# Patient Record
Sex: FEMALE | Race: ASIAN
[De-identification: names, ages, dates, MRNs, and addresses within clinical notes are randomized per-mention and may not be internally consistent; named-entity substitution may affect disease eponyms.]

---

## 2019-10-03 ENCOUNTER — RESULT REVIEW (OUTPATIENT)
Age: 69
End: 2019-10-03

## 2022-04-22 ENCOUNTER — APPOINTMENT (OUTPATIENT)
Dept: ORTHOPEDIC SURGERY | Facility: CLINIC | Age: 72
End: 2022-04-22

## 2022-05-04 ENCOUNTER — APPOINTMENT (OUTPATIENT)
Dept: ORTHOPEDIC SURGERY | Facility: CLINIC | Age: 72
End: 2022-05-04
Payer: MEDICARE

## 2022-05-04 DIAGNOSIS — Z86.79 PERSONAL HISTORY OF OTHER DISEASES OF THE CIRCULATORY SYSTEM: ICD-10-CM

## 2022-05-04 DIAGNOSIS — Z85.3 PERSONAL HISTORY OF MALIGNANT NEOPLASM OF BREAST: ICD-10-CM

## 2022-05-04 DIAGNOSIS — Z86.39 PERSONAL HISTORY OF OTHER ENDOCRINE, NUTRITIONAL AND METABOLIC DISEASE: ICD-10-CM

## 2022-05-04 PROBLEM — Z00.00 ENCOUNTER FOR PREVENTIVE HEALTH EXAMINATION: Status: ACTIVE | Noted: 2022-05-04

## 2022-05-04 PROCEDURE — 99213 OFFICE O/P EST LOW 20 MIN: CPT

## 2022-05-04 PROCEDURE — 73120 X-RAY EXAM OF HAND: CPT | Mod: RT

## 2022-05-04 RX ORDER — DICLOFENAC SODIUM 1% 10 MG/G
1 GEL TOPICAL 4 TIMES DAILY
Qty: 1 | Refills: 0 | Status: ACTIVE | COMMUNITY
Start: 2022-05-04 | End: 1900-01-01

## 2022-05-04 RX ORDER — LETROZOLE TABLETS 2.5 MG/1
2.5 TABLET, FILM COATED ORAL
Refills: 0 | Status: ACTIVE | COMMUNITY

## 2022-05-04 RX ORDER — SITAGLIPTIN 100 MG/1
100 TABLET, FILM COATED ORAL
Refills: 0 | Status: ACTIVE | COMMUNITY

## 2022-05-04 RX ORDER — LOSARTAN POTASSIUM 50 MG/1
50 TABLET, FILM COATED ORAL
Refills: 0 | Status: ACTIVE | COMMUNITY

## 2022-05-04 RX ORDER — ROSUVASTATIN CALCIUM 10 MG/1
10 TABLET, FILM COATED ORAL
Refills: 0 | Status: ACTIVE | COMMUNITY

## 2022-05-04 NOTE — HISTORY OF PRESENT ILLNESS
[Sharp] : sharp [Intermittent] : intermittent [de-identified] : RHD patient reports pain in right palm and middle digit\par Reports feeling "sharp pain" when gripping \par Denies numbess or tingling \par Reports having CSI in the past and going to PT with some relief.  [] : no [FreeTextEntry8] : writing/ gripping [de-identified] : 7/2021 [de-identified] : [facility]- had CSI with improvement [de-identified] : 7/2021 [de-identified] : pt is not working

## 2022-05-04 NOTE — ASSESSMENT
[FreeTextEntry1] : Recommend: - rest - ice - compression - elevation \par \par Patient will begin physical therapy. \par \par Voltaren \par \par Follow up in 1 month

## 2022-05-04 NOTE — PHYSICAL EXAM
[Right] : right hand [A1-Pulley] : A1-pulley [2nd] : 2nd [3rd] : 3rd [] : no focal motor deficits [FreeTextEntry9] : Unable to flex all fingers

## 2022-06-15 ENCOUNTER — APPOINTMENT (OUTPATIENT)
Dept: ORTHOPEDIC SURGERY | Facility: CLINIC | Age: 72
End: 2022-06-15
Payer: MEDICARE

## 2022-06-15 VITALS — HEIGHT: 63 IN | WEIGHT: 163 LBS | BODY MASS INDEX: 28.88 KG/M2

## 2022-06-15 PROCEDURE — 99214 OFFICE O/P EST MOD 30 MIN: CPT

## 2022-06-15 NOTE — PHYSICAL EXAM
[Right] : right hand [3rd] : 3rd [A1-Pulley] : A1-pulley [] : no focal motor deficits [FreeTextEntry9] : Unable to flex all fingers

## 2022-06-15 NOTE — HISTORY OF PRESENT ILLNESS
[Sharp] : sharp [Intermittent] : intermittent [de-identified] : RHD patient reports pain in right palm and middle digit\par Reports feeling "sharp pain" when gripping \par Denies numbess or tingling \par Reports having CSI in the past and going to PT with some relief.  [] : no [FreeTextEntry8] : writing/ gripping [de-identified] : 7/2021 [de-identified] : [facility]- had CSI with improvement [de-identified] : 7/2021 [de-identified] : pt is not working

## 2022-07-05 ENCOUNTER — FORM ENCOUNTER (OUTPATIENT)
Age: 72
End: 2022-07-05

## 2022-07-28 ENCOUNTER — FORM ENCOUNTER (OUTPATIENT)
Age: 72
End: 2022-07-28

## 2022-08-25 ENCOUNTER — APPOINTMENT (OUTPATIENT)
Dept: ORTHOPEDIC SURGERY | Facility: CLINIC | Age: 72
End: 2022-08-25

## 2022-08-25 VITALS — BODY MASS INDEX: 28.88 KG/M2 | HEIGHT: 63 IN | WEIGHT: 163 LBS

## 2022-08-25 PROCEDURE — 99213 OFFICE O/P EST LOW 20 MIN: CPT

## 2022-08-25 NOTE — ASSESSMENT
[FreeTextEntry1] : Continue PT \par \par Recommend: - rest - ice - compression - elevation \par \par Follow up in 2 months

## 2022-08-25 NOTE — HISTORY OF PRESENT ILLNESS
[Sharp] : sharp [Intermittent] : intermittent [de-identified] : Follow up right hand. Feels improvement since last visit. Going to PT 3x a week with relief, wants to continue, needs new rx. Denies pain meds. [] : no [FreeTextEntry8] : writing/ gripping [de-identified] : 7/2021 [de-identified] : [facility]- had CSI with improvement [de-identified] : 7/2021 [de-identified] : pt is not working

## 2022-08-25 NOTE — PHYSICAL EXAM
[Right] : right hand [3rd] : 3rd [A1-Pulley] : A1-pulley [4___] : grasp 4[unfilled]/5 [] : no focal motor deficits [FreeTextEntry9] : Unable to flex all fingers

## 2022-09-11 ENCOUNTER — FORM ENCOUNTER (OUTPATIENT)
Age: 72
End: 2022-09-11

## 2022-10-04 ENCOUNTER — FORM ENCOUNTER (OUTPATIENT)
Age: 72
End: 2022-10-04

## 2022-10-27 ENCOUNTER — APPOINTMENT (OUTPATIENT)
Dept: ORTHOPEDIC SURGERY | Facility: CLINIC | Age: 72
End: 2022-10-27

## 2022-10-27 ENCOUNTER — FORM ENCOUNTER (OUTPATIENT)
Age: 72
End: 2022-10-27

## 2022-10-27 VITALS — WEIGHT: 163 LBS | BODY MASS INDEX: 28.88 KG/M2 | HEIGHT: 63 IN

## 2022-10-27 PROCEDURE — 99214 OFFICE O/P EST MOD 30 MIN: CPT

## 2022-10-27 NOTE — HISTORY OF PRESENT ILLNESS
[Sharp] : sharp [Intermittent] : intermittent [de-identified] : Follow up right hand. Feels the hand is improving. Going to PT 3x a week with relief. Denies pain meds. [] : no [FreeTextEntry8] : writing/ gripping [de-identified] : 7/2021 [de-identified] : [facility]- had CSI with improvement [de-identified] : 7/2021 [de-identified] : pt is not working

## 2022-11-10 ENCOUNTER — FORM ENCOUNTER (OUTPATIENT)
Age: 72
End: 2022-11-10

## 2022-11-20 ENCOUNTER — FORM ENCOUNTER (OUTPATIENT)
Age: 72
End: 2022-11-20

## 2022-11-29 ENCOUNTER — FORM ENCOUNTER (OUTPATIENT)
Age: 72
End: 2022-11-29

## 2022-12-22 ENCOUNTER — APPOINTMENT (OUTPATIENT)
Dept: ORTHOPEDIC SURGERY | Facility: CLINIC | Age: 72
End: 2022-12-22

## 2022-12-22 VITALS — WEIGHT: 163 LBS | BODY MASS INDEX: 28.88 KG/M2 | HEIGHT: 63 IN

## 2022-12-22 DIAGNOSIS — M65.331 TRIGGER FINGER, RIGHT MIDDLE FINGER: ICD-10-CM

## 2022-12-22 DIAGNOSIS — M65.9 SYNOVITIS AND TENOSYNOVITIS, UNSPECIFIED: ICD-10-CM

## 2022-12-22 DIAGNOSIS — M65.321 TRIGGER FINGER, RIGHT INDEX FINGER: ICD-10-CM

## 2022-12-22 PROCEDURE — 99213 OFFICE O/P EST LOW 20 MIN: CPT

## 2022-12-22 NOTE — HISTORY OF PRESENT ILLNESS
[Sharp] : sharp [Intermittent] : intermittent [de-identified] : Follow up right hand. Doing well. Going to PT 3x a week with relief. Denies pain meds. [] : no [FreeTextEntry8] : writing/ gripping [de-identified] : 7/2021 [de-identified] : [facility]- had CSI with improvement [de-identified] : 7/2021 [de-identified] : pt is not working

## 2022-12-22 NOTE — PHYSICAL EXAM
[Right] : right hand [A1-Pulley] : A1-pulley [4___] : grasp 4[unfilled]/5 [3rd] : 3rd [] : no focal motor deficits [FreeTextEntry9] : Unable to flex all fingers

## 2023-03-01 ENCOUNTER — APPOINTMENT (OUTPATIENT)
Dept: ORTHOPEDIC SURGERY | Facility: CLINIC | Age: 73
End: 2023-03-01

## 2024-09-23 ENCOUNTER — APPOINTMENT (OUTPATIENT)
Dept: NEUROLOGY | Facility: CLINIC | Age: 74
End: 2024-09-23
Payer: MEDICARE

## 2024-09-23 VITALS
SYSTOLIC BLOOD PRESSURE: 145 MMHG | DIASTOLIC BLOOD PRESSURE: 82 MMHG | WEIGHT: 158 LBS | OXYGEN SATURATION: 98 % | BODY MASS INDEX: 28 KG/M2 | HEIGHT: 63 IN | HEART RATE: 101 BPM

## 2024-09-23 DIAGNOSIS — G57.93 UNSPECIFIED MONONEUROPATHY OF BILATERAL LOWER LIMBS: ICD-10-CM

## 2024-09-23 PROCEDURE — 99205 OFFICE O/P NEW HI 60 MIN: CPT

## 2024-09-23 RX ORDER — SEMAGLUTIDE 0.68 MG/ML
2 INJECTION, SOLUTION SUBCUTANEOUS
Qty: 6 | Refills: 0 | Status: ACTIVE | COMMUNITY
Start: 2024-09-09

## 2024-09-23 RX ORDER — DAPAGLIFLOZIN 10 MG/1
10 TABLET, FILM COATED ORAL
Qty: 90 | Refills: 0 | Status: ACTIVE | COMMUNITY
Start: 2024-03-28

## 2024-09-23 RX ORDER — PHENAZOPYRIDINE HYDROCHLORIDE 100 MG/1
100 TABLET ORAL
Qty: 18 | Refills: 0 | Status: ACTIVE | COMMUNITY
Start: 2024-09-16

## 2024-09-23 RX ORDER — AMOXICILLIN 500 MG/1
500 CAPSULE ORAL
Qty: 21 | Refills: 0 | Status: ACTIVE | COMMUNITY
Start: 2024-09-13

## 2024-09-23 RX ORDER — CEFDINIR 300 MG/1
300 CAPSULE ORAL
Qty: 14 | Refills: 0 | Status: ACTIVE | COMMUNITY
Start: 2024-09-16

## 2024-09-23 NOTE — PHYSICAL EXAM
[FreeTextEntry1] : NEURO: Mental Status Oriented to person, place, time, and situation Speech is clear, fluent, and spontaneous. Comprehension and memory intact   Cranial Nerves Visual fields full to confrontation Pupils equal, round, reactive to light. Extraocular movements intact. No nystagmus or ptosis. Facial sensation intact and symmetric in V1, V2, V3 Facial movement intact and symmetric Uvula midline, soft palate elevates normally Bilateral shoulder shrug intact Tongue midline, no tongue bite sign or deviation on protrusion   Motor Tone and bulk intact Shoulder abduction: 5/5 b/l Elbow flexion/extension: 5/5 bl Hand : 5/5 b/l Hip flexion/extension: 5/5 b/l Knee flexion/extension: 5/5 b/l Dorsiflexion/plantar flexion: 5/5 b/l No pronator drift   Sensation Light touch grossly intact Vibration diminished b/l toes. Intact b/l ankles. Proprioception intact   Deep Tendon Reflexes Biceps 2+ b/l Brachioradialis 2+ b/l Patellar 1+ b/l Ankle 1+ b/l Plantar response downgoing bilaterally.   Coordination Normal finger to nose bilaterally No past pointing   Gait Normal stance, stride, and pivot turn Tandem walk intact Heel and toe gait intact. Mild sway with Romberg.     GEN: awake, alert, interactive, no acute distress EYES: sclera white, conjunctiva clear, no redness or discharge ENT: normal appearing outer ears, hearing grossly intact PULM: normal respiratory rhythm and effort, speaking in full sentences without distress, no accessory muscle usage EXT: moving all extremities spontaneously  [Over the Past 2 Weeks, Have You Felt Down, Depressed, or Hopeless?] : 1.) Over the past 2 weeks, have you felt down, depressed, or hopeless? No [Over the Past 2 Weeks, Have You Felt Little Interest or Pleasure Doing Things?] : 2.) Over the past 2 weeks, have you felt little interest or pleasure doing things? No

## 2024-09-23 NOTE — ASSESSMENT
[FreeTextEntry1] : 74 year old female with medical history significant for thyroid cancer, T2DM, HLD, HTN, GERD, OA, spinal stenosis, breast cancer s/p chemo, presenting today referred by PCP for neuropathy. She endorses years of neuropathy, only affecting the bottoms of her feet. Mildly affects her balance, and disrupts her sleep, however no significant impairment to her day-to-day.  She denies any significant lower back pain, saddle anesthesia, incontinence of bowel or bladder. On physical exam, there is diminished sensation of vibration bilateral great toes, intact bilateral ankles.  She has increased but mild body sway with Romberg.  Otherwise strength and gait intact.  Sensation to light touch and proprioception intact  Her symptoms are consistent with neuropathy, etiology likely from longstanding diabetes, and also chemotherapy for cancer in the past.  Patient declines any prescription medication for symptom relief at this time.  I discussed with her gabapentin if her symptoms should ever worsen, and discussed potential side effects.  She will call back if she decides to pursue medical management.  She may also benefit from physical therapy if her balance gets worse.  She would like to hold off on any further testing including MRI or EMG/NCS at this time.  No red flag symptoms of spinal stenosis or cord compression.  She completed antibiotics for recent UTI which resolved her urinary symptoms  Patient to return to office as needed. Patient understands to seek urgent medical evaluation for any new or worsening symptoms.

## 2024-09-23 NOTE — HISTORY OF PRESENT ILLNESS
[FreeTextEntry1] : Tran Thomas is a 74 year old female with medical history significant for thyroid cancer, DM, HLD, HTN, GERD, OA, spinal stenosis, breast cancer s/p chemo, presenting today referred by PCP for neuropathy.   She describes her symptoms an overstimulating/burning sensation at the bottoms of both feet.  It is present all day from when she wakes up to when she goes to bed.  It is most burdensome when she is trying to go to sleep, and the tingling sensation keeps her up.  Sometimes there is some numbness.  It does not go above her ankles.  Her fingertips are hands are fine sometimes she feels a little off balance but she has no significant difficulty walking, no falls or injuries.  The neuropathy has been around for years, they have been stable.  She recently got blood work with her PCP and her diabetes numbers looks okay, they are setting up new injection for this.  She denies any significant pain, in her extremities or in her lower back, no saddle anesthesia or incontinence of bowel or bladder.  She was recently seen at [facility] for UTI, was having some urinary frequency at that time, but after she completed antibiotics her bathroom habits are back to normal.  Denies any other concerns.